# Patient Record
Sex: FEMALE | Race: WHITE | NOT HISPANIC OR LATINO | Employment: OTHER | URBAN - METROPOLITAN AREA
[De-identification: names, ages, dates, MRNs, and addresses within clinical notes are randomized per-mention and may not be internally consistent; named-entity substitution may affect disease eponyms.]

---

## 2019-02-08 ENCOUNTER — HOSPITAL ENCOUNTER (OUTPATIENT)
Dept: MRI IMAGING | Facility: HOSPITAL | Age: 74
Discharge: HOME | End: 2019-02-08
Attending: INTERNAL MEDICINE

## 2019-02-12 ENCOUNTER — HOSPITAL ENCOUNTER (OUTPATIENT)
Dept: CARDIOLOGY | Facility: HOSPITAL | Age: 74
Discharge: HOME | End: 2019-02-12
Attending: INTERNAL MEDICINE

## 2019-02-22 ENCOUNTER — HOSPITAL ENCOUNTER (OUTPATIENT)
Dept: CARDIOLOGY | Facility: HOSPITAL | Age: 74
Discharge: HOME | End: 2019-02-22
Attending: INTERNAL MEDICINE

## 2019-10-16 ENCOUNTER — HOSPITAL ENCOUNTER (OUTPATIENT)
Dept: CARDIOLOGY | Facility: HOSPITAL | Age: 74
Discharge: HOME | End: 2019-10-16
Attending: SURGERY

## 2020-07-22 ENCOUNTER — HOSPITAL ENCOUNTER (OUTPATIENT)
Dept: CARDIOLOGY | Facility: HOSPITAL | Age: 75
Discharge: HOME | End: 2020-07-22
Attending: SURGERY

## 2020-09-18 ENCOUNTER — HOSPITAL ENCOUNTER (OUTPATIENT)
Dept: GENERAL RADIOLOGY | Facility: HOSPITAL | Age: 75
Discharge: HOME | End: 2020-09-18
Attending: SURGERY

## 2020-09-22 ENCOUNTER — HOSPITAL ENCOUNTER (OUTPATIENT)
Dept: CARDIOLOGY | Facility: HOSPITAL | Age: 75
Discharge: HOME | End: 2020-09-22
Attending: SURGERY

## 2020-09-25 ENCOUNTER — HOSPITAL ENCOUNTER (OUTPATIENT)
Dept: PREADMISSION TESTING | Facility: HOSPITAL | Age: 75
Discharge: HOME | End: 2020-09-25
Attending: SURGERY

## 2020-09-27 LAB — SARS-COV-2 RNA SPEC QL NAA+PROBE: NOT DETECTED

## 2020-09-30 ENCOUNTER — HOSPITAL ENCOUNTER (OUTPATIENT)
Dept: PERIOP | Facility: HOSPITAL | Age: 75
Setting detail: HOSPITAL OUTPATIENT SURGERY
Discharge: HOME | End: 2020-09-30
Attending: SURGERY

## 2020-09-30 LAB
ANION GAP SERPL CALC-SCNC: 16 MMOL/L (ref 8–19)
BASOPHILS # BLD AUTO: 0.07 10*3/UL (ref 0–0.2)
BASOPHILS NFR BLD AUTO: 1 % (ref 0–3)
BUN SERPL-MCNC: 16 MG/DL (ref 5–25)
BUN/CREAT SERPL: 11 {RATIO} (ref 6–20)
CALCIUM SERPL-MCNC: 9.1 MG/DL (ref 8.7–10.4)
CHLORIDE SERPL-SCNC: 96 MMOL/L (ref 99–111)
CONV ABS IMM GRAN: 0.02 10*3/UL (ref 0–0.2)
CONV CO2: 22 MMOL/L (ref 22–32)
CONV IMMATURE GRAN: 0.3 % (ref 0–1.8)
CREAT UR-MCNC: 1.41 MG/DL (ref 0.5–0.9)
DEPRECATED RDW RBC AUTO: 42.6 FL (ref 36.4–46.3)
EOSINOPHIL # BLD AUTO: 0.28 10*3/UL (ref 0–0.7)
EOSINOPHIL # BLD AUTO: 3.8 % (ref 0–7)
ERYTHROCYTE [DISTWIDTH] IN BLOOD BY AUTOMATED COUNT: 12.1 % (ref 11.7–14.4)
GFR SERPLBLD BASED ON 1.73 SQ M-ARVRAT: 37 ML/MIN/{1.73_M2}
GLUCOSE SERPL-MCNC: 108 MG/DL (ref 65–99)
HCT VFR BLD AUTO: 32.5 % (ref 37–47)
HGB BLD-MCNC: 11.3 G/DL (ref 12–16)
INR PPP: 0.95 (ref 2–3)
LYMPHOCYTES # BLD AUTO: 2.34 10*3/UL (ref 1–5)
LYMPHOCYTES NFR BLD AUTO: 32.1 % (ref 20–45)
MCH RBC QN AUTO: 33.7 PG (ref 27–31)
MCHC RBC AUTO-ENTMCNC: 34.8 G/DL (ref 33–37)
MCV RBC AUTO: 97 FL (ref 81–99)
MONOCYTES # BLD AUTO: 0.37 10*3/UL (ref 0.2–1.2)
MONOCYTES NFR BLD AUTO: 5.1 % (ref 3–10)
NEUTROPHILS # BLD AUTO: 4.21 10*3/UL (ref 2–8)
NEUTROPHILS NFR BLD AUTO: 57.7 % (ref 30–85)
NRBC CBCN: 0 % (ref 0–0.7)
OSMOLALITY SERPL CALC.SUM OF ELEC: 272 MOSM/KG (ref 273–304)
PLATELET # BLD AUTO: 263 10*3/UL (ref 130–400)
PMV BLD AUTO: 9.2 FL (ref 9.4–12.3)
POTASSIUM SERPL-SCNC: 3.9 MMOL/L (ref 3.5–5.3)
PROTHROMBIN TIME: 10.3 S (ref 9.4–12)
RBC # BLD AUTO: 3.35 10*6/UL (ref 4.2–5.4)
SODIUM SERPL-SCNC: 130 MMOL/L (ref 135–147)
WBC # BLD AUTO: 7.29 10*3/UL (ref 4.8–10.8)

## 2020-10-15 ENCOUNTER — HOSPITAL ENCOUNTER (OUTPATIENT)
Dept: CARDIOLOGY | Facility: HOSPITAL | Age: 75
Discharge: HOME | End: 2020-10-15
Attending: SURGERY

## 2020-10-15 LAB
CREAT BLD-MCNC: 1.2 MG/DL (ref 0.6–1.4)
GFR SERPLBLD BASED ON 1.73 SQ M-ARVRAT: 45 ML/MIN/{1.73_M2}

## 2020-10-29 ENCOUNTER — HOSPITAL ENCOUNTER (OUTPATIENT)
Dept: GENERAL RADIOLOGY | Facility: HOSPITAL | Age: 75
Discharge: HOME | End: 2020-10-29
Attending: INTERNAL MEDICINE

## 2020-12-09 ENCOUNTER — CONVERSION ENCOUNTER (OUTPATIENT)
Dept: CARDIOLOGY | Facility: CLINIC | Age: 75
End: 2020-12-09

## 2020-12-09 ENCOUNTER — OFFICE VISIT CONVERTED (OUTPATIENT)
Dept: CARDIOLOGY | Facility: CLINIC | Age: 75
End: 2020-12-09
Attending: INTERNAL MEDICINE

## 2020-12-21 ENCOUNTER — HOSPITAL ENCOUNTER (OUTPATIENT)
Dept: CARDIOLOGY | Facility: HOSPITAL | Age: 75
Discharge: HOME | End: 2020-12-21
Attending: SURGERY

## 2020-12-28 ENCOUNTER — HOSPITAL ENCOUNTER (OUTPATIENT)
Dept: NUCLEAR MEDICINE | Facility: HOSPITAL | Age: 75
Discharge: HOME | End: 2020-12-28
Attending: INTERNAL MEDICINE

## 2020-12-28 LAB
ALBUMIN SERPL-MCNC: 4.6 G/DL (ref 3.5–5)
ALBUMIN/GLOB SERPL: 1.5 {RATIO} (ref 1.4–2.6)
ALP SERPL-CCNC: 151 U/L (ref 43–160)
ALT SERPL-CCNC: 9 U/L (ref 10–40)
ANION GAP SERPL CALC-SCNC: 15 MMOL/L (ref 8–19)
AST SERPL-CCNC: 17 U/L (ref 15–50)
BASOPHILS # BLD AUTO: 0.07 10*3/UL (ref 0–0.2)
BASOPHILS NFR BLD AUTO: 0.7 % (ref 0–3)
BILIRUB SERPL-MCNC: 0.33 MG/DL (ref 0.2–1.3)
BUN SERPL-MCNC: 17 MG/DL (ref 5–25)
BUN/CREAT SERPL: 13 {RATIO} (ref 6–20)
CALCIUM SERPL-MCNC: 9.6 MG/DL (ref 8.7–10.4)
CHLORIDE SERPL-SCNC: 102 MMOL/L (ref 99–111)
CONV ABS IMM GRAN: 0.03 10*3/UL (ref 0–0.2)
CONV CO2: 22 MMOL/L (ref 22–32)
CONV IMMATURE GRAN: 0.3 % (ref 0–1.8)
CONV TOTAL PROTEIN: 7.6 G/DL (ref 6.3–8.2)
CORTIS AM PEAK SERPL-MCNC: 17.8 UG/DL (ref 6–18.4)
CREAT UR-MCNC: 1.29 MG/DL (ref 0.5–0.9)
DEPRECATED RDW RBC AUTO: 41.3 FL (ref 36.4–46.3)
EOSINOPHIL # BLD AUTO: 0.09 10*3/UL (ref 0–0.7)
EOSINOPHIL # BLD AUTO: 0.9 % (ref 0–7)
ERYTHROCYTE [DISTWIDTH] IN BLOOD BY AUTOMATED COUNT: 11.9 % (ref 11.7–14.4)
GFR SERPLBLD BASED ON 1.73 SQ M-ARVRAT: 41 ML/MIN/{1.73_M2}
GLOBULIN UR ELPH-MCNC: 3 G/DL (ref 2–3.5)
GLUCOSE SERPL-MCNC: 111 MG/DL (ref 65–99)
HCT VFR BLD AUTO: 34.7 % (ref 37–47)
HGB BLD-MCNC: 12.2 G/DL (ref 12–16)
LYMPHOCYTES # BLD AUTO: 3.02 10*3/UL (ref 1–5)
LYMPHOCYTES NFR BLD AUTO: 31 % (ref 20–45)
MCH RBC QN AUTO: 33.5 PG (ref 27–31)
MCHC RBC AUTO-ENTMCNC: 35.2 G/DL (ref 33–37)
MCV RBC AUTO: 95.3 FL (ref 81–99)
MONOCYTES # BLD AUTO: 0.59 10*3/UL (ref 0.2–1.2)
MONOCYTES NFR BLD AUTO: 6.1 % (ref 3–10)
NEUTROPHILS # BLD AUTO: 5.93 10*3/UL (ref 2–8)
NEUTROPHILS NFR BLD AUTO: 61 % (ref 30–85)
NRBC CBCN: 0 % (ref 0–0.7)
OSMOLALITY SERPL CALC.SUM OF ELEC: 282 MOSM/KG (ref 273–304)
PLATELET # BLD AUTO: 233 10*3/UL (ref 130–400)
PMV BLD AUTO: 9.9 FL (ref 9.4–12.3)
POTASSIUM SERPL-SCNC: 4.3 MMOL/L (ref 3.5–5.3)
RBC # BLD AUTO: 3.64 10*6/UL (ref 4.2–5.4)
SODIUM SERPL-SCNC: 135 MMOL/L (ref 135–147)
T4 FREE SERPL-MCNC: 1.2 NG/DL (ref 0.9–1.8)
TSH SERPL-ACNC: 1.65 M[IU]/L (ref 0.27–4.2)
WBC # BLD AUTO: 9.73 10*3/UL (ref 4.8–10.8)

## 2021-01-11 ENCOUNTER — CONVERSION ENCOUNTER (OUTPATIENT)
Dept: PREADMISSION TESTING | Facility: HOSPITAL | Age: 76
End: 2021-01-11

## 2021-01-11 ENCOUNTER — HOSPITAL ENCOUNTER (OUTPATIENT)
Dept: PREADMISSION TESTING | Facility: HOSPITAL | Age: 76
Discharge: HOME | End: 2021-01-11
Attending: SURGERY

## 2021-01-11 LAB
ALBUMIN SERPL-MCNC: 4.3 G/DL (ref 3.5–5)
ALBUMIN/GLOB SERPL: 1.7 {RATIO} (ref 1.4–2.6)
ALP SERPL-CCNC: 117 U/L (ref 43–160)
ALT SERPL-CCNC: 10 U/L (ref 10–40)
ANION GAP SERPL CALC-SCNC: 16 MMOL/L (ref 8–19)
APTT BLD: 22.6 S (ref 22.2–34.2)
AST SERPL-CCNC: 18 U/L (ref 15–50)
BASOPHILS # BLD AUTO: 0.08 10*3/UL (ref 0–0.2)
BASOPHILS NFR BLD AUTO: 1.3 % (ref 0–3)
BILIRUB SERPL-MCNC: 0.25 MG/DL (ref 0.2–1.3)
BUN SERPL-MCNC: 16 MG/DL (ref 5–25)
BUN/CREAT SERPL: 13 {RATIO} (ref 6–20)
CALCIUM SERPL-MCNC: 9.3 MG/DL (ref 8.7–10.4)
CHLORIDE SERPL-SCNC: 96 MMOL/L (ref 99–111)
CONV ABS IMM GRAN: 0.03 10*3/UL (ref 0–0.2)
CONV CO2: 24 MMOL/L (ref 22–32)
CONV IMMATURE GRAN: 0.5 % (ref 0–1.8)
CONV TOTAL PROTEIN: 6.9 G/DL (ref 6.3–8.2)
CREAT UR-MCNC: 1.19 MG/DL (ref 0.5–0.9)
DEPRECATED RDW RBC AUTO: 41.9 FL (ref 36.4–46.3)
EOSINOPHIL # BLD AUTO: 0.16 10*3/UL (ref 0–0.7)
EOSINOPHIL # BLD AUTO: 2.5 % (ref 0–7)
ERYTHROCYTE [DISTWIDTH] IN BLOOD BY AUTOMATED COUNT: 11.9 % (ref 11.7–14.4)
GFR SERPLBLD BASED ON 1.73 SQ M-ARVRAT: 45 ML/MIN/{1.73_M2}
GLOBULIN UR ELPH-MCNC: 2.6 G/DL (ref 2–3.5)
GLUCOSE SERPL-MCNC: 97 MG/DL (ref 65–99)
HCT VFR BLD AUTO: 32.1 % (ref 37–47)
HGB BLD-MCNC: 11.2 G/DL (ref 12–16)
INR PPP: 0.87 (ref 2–3)
LYMPHOCYTES # BLD AUTO: 2.75 10*3/UL (ref 1–5)
LYMPHOCYTES NFR BLD AUTO: 43.8 % (ref 20–45)
MCH RBC QN AUTO: 33.5 PG (ref 27–31)
MCHC RBC AUTO-ENTMCNC: 34.9 G/DL (ref 33–37)
MCV RBC AUTO: 96.1 FL (ref 81–99)
MONOCYTES # BLD AUTO: 0.45 10*3/UL (ref 0.2–1.2)
MONOCYTES NFR BLD AUTO: 7.2 % (ref 3–10)
NEUTROPHILS # BLD AUTO: 2.81 10*3/UL (ref 2–8)
NEUTROPHILS NFR BLD AUTO: 44.7 % (ref 30–85)
NRBC CBCN: 0 % (ref 0–0.7)
OSMOLALITY SERPL CALC.SUM OF ELEC: 273 MOSM/KG (ref 273–304)
PLATELET # BLD AUTO: 232 10*3/UL (ref 130–400)
PMV BLD AUTO: 9.9 FL (ref 9.4–12.3)
POTASSIUM SERPL-SCNC: 4.5 MMOL/L (ref 3.5–5.3)
PROTHROMBIN TIME: 9.7 S (ref 9.4–12)
RBC # BLD AUTO: 3.34 10*6/UL (ref 4.2–5.4)
SODIUM SERPL-SCNC: 131 MMOL/L (ref 135–147)
WBC # BLD AUTO: 6.28 10*3/UL (ref 4.8–10.8)

## 2021-01-12 LAB — SARS-COV-2 RNA SPEC QL NAA+PROBE: NOT DETECTED

## 2021-02-17 ENCOUNTER — HOSPITAL ENCOUNTER (OUTPATIENT)
Dept: CARDIOLOGY | Facility: HOSPITAL | Age: 76
Discharge: HOME | End: 2021-02-17
Attending: SURGERY

## 2021-04-19 ENCOUNTER — HOSPITAL ENCOUNTER (OUTPATIENT)
Dept: CARDIOLOGY | Facility: HOSPITAL | Age: 76
Discharge: HOME | End: 2021-04-19
Attending: SURGERY

## 2021-05-10 NOTE — H&P
History and Physical      Patient Name: Alyssa Cheek   Patient ID: [id number]   Sex: Female   YOB: 1945    Primary Care Provider: Ayaz Forde MD   Referring Provider: Ayaz Forde MD    Visit Date: December 9, 2020    Provider: Lynn Combs MD   Location: [facility]   Location Address: [address] [place] [address], [address]   [place]  [id number]   Location Phone: [phone]          Chief Complaint  · Preoperative assessment for high-risk procedure.   · Severe peripheral vascular disease.      History Of Present Illness  Consult requested by: KELLY CRUZ MD   Alyssa Cheek is a 75-year-old female with long-standing hypertension and peripheral vascular disease who has limiting claudication, right more than left leg, who is going to need aortobifemoral surgery. She has had trouble for 2-3 years but recently got much worse, and peripheral vascular evaluation was performed and surgery was recommended. On inquiry, she gets short of breath with exertion, but denies any chest pain. She denies palpitations, dizziness, or syncope.   PAST MEDICAL HISTORY: Hypertension; peripheral vascular disease. Negative for diabetes of myocardial infarction.   FAMILY HISTORY: Negative for premature atherosclerosis.   CURRENT MEDICATIONS: Amlodipine-Benazepril 5-20 mg daily; aspirin 81 mg daily; daily; vitamin D3 2000 units daily; Atorvastatin 20 mg daily; Cilostazol 100 mg daily; folic acid 800 mg daily.   CHOLESTEROL STATUS: Uncertain.   PSYCHOSOCIAL HISTORY: Denies mood change or depression. She is single Consumes moderate amount of caffeine. Smokes 1 pack of cigarettes per day and has done so for almost 50+ years. Rarely uses alcohol. She does not exercise much. She is retired.   ALLERGIES: No known drug allergies.       Review of Systems  · Constitutional  o Admits  o : good general health lately, recent weight changes   o Denies  o : fatigue  · Eyes  o Denies  o : double vision  · HENT  o Admits  o :  "hearing loss or ringing  o Denies  o : chronic sinus problem, swollen glands in neck  · Cardiovascular  o Denies  o : chest pain, palpitations (fast, fluttering, or skipping beats), swelling (feet, ankles, hands), shortness of breath while walking or lying flat  · Respiratory  o Denies  o : shortness of breath, asthma or wheezing, COPD  · Gastrointestinal  o Denies  o : ulcers, nausea or vomiting  · Neurologic  o Denies  o : lightheaded or dizzy, stroke, headaches  · Musculoskeletal  o Denies  o : joint pain, back pain  · Endocrine  o Admits  o : heat or cold intolerance  o Denies  o : thyroid disease, diabetes, excessive thirst or urination  · Heme-Lymph  o Admits  o : bleeding or bruising tendency  o Denies  o : anemia      Vitals  Date Time BP Position Site L\R Cuff Size HR RR TEMP (F) WT  HT  BMI kg/m2 BSA m2 O2 Sat FR L/min FiO2 HC       12/09/2020 02:11 PM 92/62 Sitting    92 - R   100lbs 0oz 5'  2\" 18.29 1.41             Physical Examination  · Constitutional  o Appearance  o : Awake, alert, in no acute distress.  · Eyes  o Conjunctivae  o : Conjunctivae normal.  o Pupils and Irises  o : Fundi normal.  · Ears, Nose, Mouth and Throat  o Oral Cavity  o :   § Oral Mucosa  § : Normal oral mucosa.  · Neck  o Inspection/Palpation/Auscultation  o : No lymphadenopathy. No JVD. No bruit. Good carotid upstroke.  · Respiratory  o Respiratory  o : Increased AP diameter with diminished breath sounds. Prolonged expiration. A few rhonchi. No rales.   · Cardiovascular  o Heart  o : PMI is not well felt. S1, S2 normal. No gallops or murmurs.   o Peripheral Vascular System  o :   § Femoral Arteries  § : Good femoral pulses.  § Pedal Pulses  § : Weak femoral and pedal pulses. No pedal edema.  · Gastrointestinal  o Abdominal Examination  o : Soft. No masses or tenderness noted. No hepatosplenomegaly. Abdominal aorta not palpable.  · Musculoskeletal  o General  o : Muscle strength is normal with normal tone.  · Skin and " Subcutaneous Tissue  o General Inspection  o : Within normal limits.  o Digits and Nails  o : Nails are normal.  · Imaging  o Imaging  o : Carotid Doppler and AELE reviewed.   · EKG  o Results  o : Sinus rhythm, left axis deviation. RSR in V1 and V2 suggestive of right ventricular conduction delay or RVH.  · Labs  o Labs  o : Chemistry panel is normal except for slightly low sodium of 134 and 130. Most recent lipid panel, HDL 63,LDL 69, triglyceride 91.     The patient had an echocardiogram a year and a half ago which revealed normal RV and LV function with calcification of the aortic root and valve.           Assessment     1.  Hypertensive heart disease.   2.  Hyperlipidemia, LDL at goal.   3.  COPD.   4.  Nicotine dependence, cigarettes    5.  Severe peripheral vascular disease.       Plan     1.  Patient is at moderate risk for perioperative events following high-risk vascular surgery.   2.  High likelihood of underlying coronary artery disease.  3.  Discussed with her options regarding further evaluation of ischemic heart disease including stress test. This        will give her an idea about her coronary perfusion as well as her LV function.  4.  Will get repeat thyroid panel and chemistry panel on the day of her stress test.   5.  Discussed with her avoidance of excessive water intake.  6.  Further management decisions contingent upon the above evaluation and response to treatment.      Lynn Combs MD, [facility]  PM/pap    This note was transcribed by Inez Cho.  pap/pm  The above service was transcribed by Inez Cho, and I attest to the accuracy of the note.  PM             Electronically Signed by: Ariela Cho-, Other -Author on December 15, 2020 02:33:57 PM  Electronically Co-signed by: Lynn Combs MD -Reviewer on December 18, 2020 11:40:10 PM

## 2021-05-14 VITALS
DIASTOLIC BLOOD PRESSURE: 62 MMHG | HEART RATE: 92 BPM | SYSTOLIC BLOOD PRESSURE: 92 MMHG | BODY MASS INDEX: 18.4 KG/M2 | WEIGHT: 100 LBS | HEIGHT: 62 IN

## 2021-08-09 ENCOUNTER — TRANSCRIBE ORDERS (OUTPATIENT)
Dept: VASCULAR SURGERY | Facility: HOSPITAL | Age: 76
End: 2021-08-09

## 2021-08-09 DIAGNOSIS — I73.9 PVD (PERIPHERAL VASCULAR DISEASE) (HCC): Primary | ICD-10-CM

## 2021-08-16 RX ORDER — CILOSTAZOL 100 MG/1
TABLET ORAL
Qty: 60 TABLET | Refills: 0 | Status: SHIPPED | OUTPATIENT
Start: 2021-08-16

## 2021-09-08 ENCOUNTER — HOSPITAL ENCOUNTER (OUTPATIENT)
Dept: CARDIOLOGY | Facility: HOSPITAL | Age: 76
Discharge: HOME | End: 2021-09-08

## 2021-09-08 ENCOUNTER — OFFICE VISIT (OUTPATIENT)
Dept: VASCULAR SURGERY | Facility: HOSPITAL | Age: 76
End: 2021-09-08

## 2021-09-08 VITALS
OXYGEN SATURATION: 98 % | HEART RATE: 65 BPM | SYSTOLIC BLOOD PRESSURE: 125 MMHG | DIASTOLIC BLOOD PRESSURE: 74 MMHG | TEMPERATURE: 97.3 F | RESPIRATION RATE: 16 BRPM

## 2021-09-08 DIAGNOSIS — I70.213 ATHEROSCLEROSIS OF NATIVE ARTERY OF BOTH LOWER EXTREMITIES WITH INTERMITTENT CLAUDICATION (HCC): Primary | ICD-10-CM

## 2021-09-08 DIAGNOSIS — I73.9 PVD (PERIPHERAL VASCULAR DISEASE) (HCC): ICD-10-CM

## 2021-09-08 LAB
BH CV LOWER ARTERIAL LEFT ABI RATIO: 0.6
BH CV LOWER ARTERIAL LEFT DORSALIS PEDIS SYS MAX: 73 MMHG
BH CV LOWER ARTERIAL LEFT GREAT TOE SYS MAX: 67 MMHG
BH CV LOWER ARTERIAL LEFT LOW THIGH SYS MAX: 113 MMHG
BH CV LOWER ARTERIAL LEFT POPLITEAL SYS MAX: 80 MMHG
BH CV LOWER ARTERIAL LEFT POST TIBIAL SYS MAX: 75 MMHG
BH CV LOWER ARTERIAL LEFT TBI RATIO: 0.54
BH CV LOWER ARTERIAL RIGHT ABI RATIO: 0.83
BH CV LOWER ARTERIAL RIGHT DORSALIS PEDIS SYS MAX: 101 MMHG
BH CV LOWER ARTERIAL RIGHT GREAT TOE SYS MAX: 79 MMHG
BH CV LOWER ARTERIAL RIGHT LOW THIGH SYS MAX: 120 MMHG
BH CV LOWER ARTERIAL RIGHT POPLITEAL SYS MAX: 97 MMHG
BH CV LOWER ARTERIAL RIGHT POST TIBIAL SYS MAX: 104 MMHG
BH CV LOWER ARTERIAL RIGHT TBI RATIO: 0.63
MAXIMAL PREDICTED HEART RATE: 146 BPM
STRESS TARGET HR: 124 BPM
UPPER ARTERIAL LEFT ARM BRACHIAL SYS MAX: 125 MMHG
UPPER ARTERIAL RIGHT ARM BRACHIAL SYS MAX: 116 MMHG

## 2021-09-08 PROCEDURE — 99213 OFFICE O/P EST LOW 20 MIN: CPT | Performed by: NURSE PRACTITIONER

## 2021-09-08 PROCEDURE — 93923 UPR/LXTR ART STDY 3+ LVLS: CPT

## 2021-09-08 PROCEDURE — 93923 UPR/LXTR ART STDY 3+ LVLS: CPT | Performed by: SURGERY

## 2021-09-08 RX ORDER — ACETAMINOPHEN 160 MG
2000 TABLET,DISINTEGRATING ORAL DAILY
COMMUNITY
Start: 2021-09-02

## 2021-09-08 RX ORDER — ASPIRIN 81 MG/1
TABLET ORAL
COMMUNITY

## 2021-09-08 RX ORDER — ATORVASTATIN CALCIUM 20 MG/1
20 TABLET, FILM COATED ORAL DAILY
COMMUNITY
Start: 2021-07-31

## 2021-09-08 RX ORDER — AMLODIPINE BESYLATE AND BENAZEPRIL HYDROCHLORIDE 5; 20 MG/1; MG/1
1 CAPSULE ORAL DAILY
COMMUNITY
Start: 2021-06-08

## 2021-09-08 RX ORDER — IBUPROFEN 200 MG
200 TABLET ORAL
COMMUNITY

## 2021-09-08 NOTE — PROGRESS NOTES
[facility] Vascular Surgery Office Follow Up Note     Date of Encounter: 09/08/2021     MRN Number: [id number]  Name: Alyssa Cheek  Phone Number: [phone]     Referred By: No ref. provider found  PCP: Ayaz Forde MD    Chief Complaint:    Chief Complaint   Patient presents with   • Peripheral Vascular Disease     Patient is here for 6 month follow up for PVD       Subjective      History of Present Illness:    Alyssa Cheek is a 76 y.o. female who presents for a routine 3-month follow-up from a axillary bifemoral bypass graft that was performed on 1/15/2021 by Dr. Gil.  She explains explains that the left leg feels heavy at times but is able to continue walking.  She denies any rest pain or nonhealing ulcers.  She smokes about 10 cigarettes/day.    Review of Systems:  Review of Systems   Constitutional: Negative.   Cardiovascular: Negative.    Respiratory: Negative.    Skin: Negative.    Musculoskeletal: Negative.    Neurological: Negative.    Psychiatric/Behavioral: Negative.        I have reviewed the following portions of the patient's history: allergies, current medications, past family history, past medical history, past social history, past surgical history and problem list and confirm it's accurate.    Allergies:  No Known Allergies    Medications:      Current Outpatient Medications:   •  amLODIPine-benazepril (LOTREL 5-20) 5-20 MG per capsule, Take 1 capsule by mouth Daily., Disp: , Rfl:   •  aspirin (aspirin) 81 MG EC tablet, Aspir-81 Oral tablet,delayed release (DR/EC) 81 mg take 1 tablet (81 mg) by oral route once daily   Active, Disp: , Rfl:   •  atorvastatin (LIPITOR) 20 MG tablet, Take 20 mg by mouth Daily., Disp: , Rfl:   •  Cholecalciferol (Vitamin D3) 50 MCG (2000 UT) capsule, Take 2,000 Units by mouth Daily., Disp: , Rfl:   •  cilostazol (PLETAL) 100 MG tablet, Take 1 tablet by mouth twice daily, Disp: 60 tablet, Rfl: 0  •  ibuprofen (ADVIL,MOTRIN) 200 MG tablet, Take 200  mg by mouth., Disp: , Rfl:     History:   Past Medical History:   Diagnosis Date   • Hyperlipidemia    • Hypertension        History reviewed. No pertinent surgical history.    Social History     Socioeconomic History   • Marital status: Single     Spouse name: Not on file   • Number of children: Not on file   • Years of education: Not on file   • Highest education level: Not on file   Tobacco Use   • Smoking status: Current Every Day Smoker     Packs/day: 0.50   Substance and Sexual Activity   • Alcohol use: Not Currently   • Drug use: Never   • Sexual activity: Defer        History reviewed. No pertinent family history.    Objective     Physical Exam:  Vitals:    09/08/21 0922 09/08/21 0923   BP: 116/76 125/74   BP Location: Right arm Left arm   Patient Position: Sitting Sitting   Cuff Size: Large Adult Large Adult   Pulse: 65    Resp: 16    Temp: 97.3 °F (36.3 °C)    TempSrc: Temporal    SpO2: 98%    PainSc: 0-No pain       There is no height or weight on file to calculate BMI.    Physical Exam  Constitutional:       Appearance: Normal appearance.   HENT:      Head: Normocephalic and atraumatic.   Cardiovascular:      Comments: Bilateral lower extremities: Nonpalpable DP pulses.  Pulmonary:      Effort: Pulmonary effort is normal.      Breath sounds: Normal breath sounds.   Musculoskeletal:         General: Normal range of motion.      Cervical back: Normal range of motion.   Skin:     General: Skin is warm and dry.      Capillary Refill: Capillary refill takes 2 to 3 seconds.      Comments: Bilateral lower extremities: Warm and no open ulcers.   Neurological:      General: No focal deficit present.      Mental Status: She is alert and oriented to person, place, and time.   Psychiatric:         Mood and Affect: Mood normal.         Behavior: Behavior normal.         Imaging/Labs:    I have reviewed the preliminary results of the lower extremity arterial Doppler performed today.    Lower Arterial Doppler  Pressures     Right Pressure Left Pressure   Brachial 116 mmHg       125 mmHg         High Thigh           Low Thigh 120 mmHg       113 mmHg         Popliteal 97 mmHg       80 mmHg          mmHg       73 mmHg          mmHg       75 mmHg         Peroneal           Great Toe 79 mmHg       67 mmHg         2nd Digit           3rd Digit           4th Digit           5th Digit              Lower Arterial Doppler Ratios     Right Ratio Left Ratio   Calf           KENZIE 0.83        0.6          TBI 0.63        0.54          Post Ex KENZIE              Assessment / Plan      Assessment / Plan:  Diagnoses and all orders for this visit:    1. Atherosclerosis of native artery of both lower extremities with intermittent claudication (CMS/HCC) (Primary)  -     Duplex Lower Extremity Art / Grafts - Bilateral CAR; Future       Ms. Cheek had a arterial Doppler of the lower extremities today as a routine 3-month follow-up from axillobifemoral bypass graft performed on 1/15/2021 by Dr. Gil.  The KENZIE on the right today was 0.83 in the left was a 0.66, they were previously a 0.77 on the right and 0.61 on the left.    I have discussed the findings with Ms. Cheek and given her symptoms I recommend we do a 3-month follow-up with an arterial duplex.  I have explained that if she should develop any worsening symptoms that she may follow-up sooner.    Thank you for allowing me to participate in your patient's care.    Patient Education:  She smokes approximately 10 cigarettes/day and has no desire to pursue smoking cessation at this time.    Follow Up:   Return in about 3 months (around 12/8/2021) for Recheck.   Or sooner for any further concerns or worsening sign and symptoms. If unable to reach us in the office please dial 911 or go to the nearest emergency department.      José MILAN  [facility] Vascular Surgery

## 2021-12-08 ENCOUNTER — HOSPITAL ENCOUNTER (OUTPATIENT)
Dept: CARDIOLOGY | Facility: HOSPITAL | Age: 76
Discharge: HOME | End: 2021-12-08
Admitting: NURSE PRACTITIONER

## 2021-12-08 ENCOUNTER — APPOINTMENT (OUTPATIENT)
Dept: VASCULAR SURGERY | Facility: HOSPITAL | Age: 76
End: 2021-12-08

## 2021-12-08 DIAGNOSIS — I70.213 ATHEROSCLEROSIS OF NATIVE ARTERY OF BOTH LOWER EXTREMITIES WITH INTERMITTENT CLAUDICATION (HCC): ICD-10-CM

## 2021-12-08 LAB
BH CV GRAFT BRACHIAL PRESSURE LEFT: 129 MMHG
BH CV GRAFT BRACHIAL PRESSURE RIGHT: 114 MMHG
BH CV LEA LEFT ANT TIBIAL A DISTAL EDV: 7 CM/S
BH CV LEA LEFT ANT TIBIAL A DISTAL PSV: 23 CM/S
BH CV LEA LEFT CFA PROX EDV: 7 CM/S
BH CV LEA LEFT CFA PROX PSV: 40 CM/S
BH CV LEA LEFT DFA PROX EDV: 15 CM/S
BH CV LEA LEFT DFA PROX PSV: 60 CM/S
BH CV LEA LEFT DPA PRESSURE: 75 MMHG
BH CV LEA LEFT PERONEAL  DISTAL EDV: 6 CM/S
BH CV LEA LEFT PERONEAL  DISTAL PSV: 22 CM/S
BH CV LEA LEFT PERONEAL  MID EDV: 11 CM/S
BH CV LEA LEFT PERONEAL  MID PSV: 23 CM/S
BH CV LEA LEFT POPITEAL A  PROX EDV: 9 CM/S
BH CV LEA LEFT POPITEAL A  PROX PSV: 43 CM/S
BH CV LEA LEFT PTA DISTAL EDV: 5 CM/S
BH CV LEA LEFT PTA DISTAL PSV: 11 CM/S
BH CV LEA LEFT PTA MID EDV: 2 CM/S
BH CV LEA LEFT PTA MID PSV: 16 CM/S
BH CV LEA LEFT PTA PRESSURE: 88 MMHG
BH CV LEA LEFT PTA PROX EDV: 9 CM/S
BH CV LEA LEFT PTA PROX PSV: 27 CM/S
BH CV LEA LEFT SFA DISTAL EDV: 13.4 CM/S
BH CV LEA LEFT SFA DISTAL PSV: 123 CM/S
BH CV LEA LEFT SFA MID EDV: 13 CM/S
BH CV LEA LEFT SFA MID PSV: 78 CM/S
BH CV LEA LEFT SFA PROX EDV: 15 CM/S
BH CV LEA LEFT SFA PROX PSV: 86 CM/S
BH CV LEA RIGHT ANT TIBIAL A MID EDV: 4 CM/S
BH CV LEA RIGHT ANT TIBIAL A MID PSV: 30 CM/S
BH CV LEA RIGHT CFA PROX EDV: 12 CM/S
BH CV LEA RIGHT CFA PROX PSV: 127 CM/S
BH CV LEA RIGHT DFA PROX EDV: 8 CM/S
BH CV LEA RIGHT DFA PROX PSV: 60 CM/S
BH CV LEA RIGHT DPA PRESSURE: 106 MMHG
BH CV LEA RIGHT PERONEAL  MID EDV: 4 CM/S
BH CV LEA RIGHT PERONEAL  MID PSV: 38 CM/S
BH CV LEA RIGHT POPITEAL A  MID EDV: 7 CM/S
BH CV LEA RIGHT POPITEAL A  MID PSV: 49 CM/S
BH CV LEA RIGHT PTA MID EDV: 7 CM/S
BH CV LEA RIGHT PTA MID PSV: 39 CM/S
BH CV LEA RIGHT PTA PRESSURE: 111 MMHG
BH CV LEA RIGHT SFA DISTAL EDV: 7 CM/S
BH CV LEA RIGHT SFA DISTAL PSV: 42 CM/S
BH CV LEA RIGHT SFA PROX EDV: 6.39 CM/S
BH CV LEA RIGHT SFA PROX PSV: 66.8 CM/S
BH CV LOWER ARTERIAL LEFT ABI RATIO: 0.7
BH CV LOWER ARTERIAL RIGHT ABI RATIO: 0.9
MAXIMAL PREDICTED HEART RATE: 146 BPM
STRESS TARGET HR: 124 BPM

## 2021-12-08 PROCEDURE — 93925 LOWER EXTREMITY STUDY: CPT | Performed by: SURGERY

## 2021-12-08 PROCEDURE — 93925 LOWER EXTREMITY STUDY: CPT

## 2021-12-27 ENCOUNTER — OFFICE VISIT (OUTPATIENT)
Dept: VASCULAR SURGERY | Facility: HOSPITAL | Age: 76
End: 2021-12-27

## 2021-12-27 VITALS
SYSTOLIC BLOOD PRESSURE: 100 MMHG | RESPIRATION RATE: 18 BRPM | DIASTOLIC BLOOD PRESSURE: 50 MMHG | TEMPERATURE: 97.8 F | OXYGEN SATURATION: 98 % | HEART RATE: 80 BPM

## 2021-12-27 DIAGNOSIS — I70.213 ATHEROSCLEROSIS OF NATIVE ARTERY OF BOTH LOWER EXTREMITIES WITH INTERMITTENT CLAUDICATION (HCC): Primary | ICD-10-CM

## 2021-12-27 PROCEDURE — G0463 HOSPITAL OUTPT CLINIC VISIT: HCPCS | Performed by: SURGERY

## 2021-12-27 PROCEDURE — 99212 OFFICE O/P EST SF 10 MIN: CPT | Performed by: SURGERY

## 2021-12-27 NOTE — PROGRESS NOTES
[facility]   Follow up Office    Patient Name: Alyssa Cheek  : 1945  MRN: [id number]  Primary Care Physician:  Ayaz Forde MD      Subjective   Subjective     HPI:    Alyssa Cheek is a 76 y.o. female who underwent a right axillobifemoral bypass graft 1/15/2021.  She is here for routine follow-up.  No other complaints at this time.      Objective     Vitals:   Temp:  [97.8 °F (36.6 °C)] 97.8 °F (36.6 °C)  Heart Rate:  [80] 80  Resp:  [18] 18  BP: (100)/(50) 100/50    Physical Exam      General: Alert, no acute distress.  Extremities: Symmetric.    Diagnostic studies: An arterial Doppler in the office today demonstrates ABIs of 0.9 on the right, 0.7 on the left.  This appears stable and slightly improved when compared to prior studies.    Assessment/Plan   Assessment / Plan     Diagnoses and all orders for this visit:    1. Atherosclerosis of native artery of both lower extremities with intermittent claudication (HCC) (Primary)  -     Doppler Arterial Multi Level Lower Extremity - Bilateral CAR; Future       Assessment/Plan:   Satisfactory progress following axillobifemoral bypass graft.  Follow-up with repeat noninvasive studies in 6 months.  Continue walking program.        Electronically signed by Claude Gil MD, 21, 3:40 PM EST.